# Patient Record
Sex: FEMALE | Race: BLACK OR AFRICAN AMERICAN
[De-identification: names, ages, dates, MRNs, and addresses within clinical notes are randomized per-mention and may not be internally consistent; named-entity substitution may affect disease eponyms.]

---

## 2018-02-19 ENCOUNTER — HOSPITAL ENCOUNTER (EMERGENCY)
Dept: HOSPITAL 17 - NEPK | Age: 48
Discharge: HOME | End: 2018-02-19
Payer: SELF-PAY

## 2018-02-19 VITALS
SYSTOLIC BLOOD PRESSURE: 124 MMHG | OXYGEN SATURATION: 98 % | DIASTOLIC BLOOD PRESSURE: 88 MMHG | HEART RATE: 94 BPM | RESPIRATION RATE: 16 BRPM | TEMPERATURE: 98.6 F

## 2018-02-19 DIAGNOSIS — S90.32XA: Primary | ICD-10-CM

## 2018-02-19 PROCEDURE — 73630 X-RAY EXAM OF FOOT: CPT

## 2018-02-19 PROCEDURE — 99283 EMERGENCY DEPT VISIT LOW MDM: CPT

## 2018-02-19 NOTE — PD
HPI


Chief Complaint:  Injury


Time Seen by Provider:  15:43


Travel History


International Travel<30 days:  No


Contact w/Intl Traveler<30days:  No


Traveled to known affect area:  No





History of Present Illness


HPI


48-year-old female here for evaluation of left foot pain.  She reports that 1 

week ago she dropped a plastic window blind on her left foot.  Since then she 

has had pain and bruising on the dorsum of the left foot.  Pain is throbbing, 

worse when walking.  She denies any puncture wounds.  She denies any other 

injuries and she has no other complaints at this time.





On license of [facility]


Social History


Alcohol Use:  No


Tobacco Use:  No





Allergies-Medications


(Allergen,Severity, Reaction):  


Coded Allergies:  


     No Known Allergies (Unverified , 2/19/18)


Reported Meds & Prescriptions





Reported Meds & Active Scripts


Active


Diclofenac Sodium DR (Diclofenac Sodium) 75 Mg Tabdr 75 Mg PO BID 10 Days








Review of Systems


Musculoskeletal:  Positive: Pain


Skin:  Positive Other (positive for bruising, pain)





Physical Exam


Narrative


GENERAL: Well-nourished female in no acute distress


SKIN: Warm and dry.  Bruising noted to the dorsum of the right foot.  

Nonerythematous.  No puncture wounds noted.


CARDIOVASCULAR: Regular rate and rhythm.  No murmur appreciated.


RESPIRATORY: No accessory muscle use. Clear to auscultation. Breath sounds 

equal bilaterally. 


MUSCULOSKELETAL: Skin as noted above with generalized tenderness to palpation 

of the dorsum of the left foot.  The patient intends full range of motion of 

the lower extremities.  2+ dorsalis pedis pulse.


NEUROLOGICAL: Awake and alert. No obvious cranial nerve deficits.  Motor 

grossly within normal limits. Normal speech.





Data


Data


Last Documented VS





Vital Signs








  Date Time  Temp Pulse Resp B/P (MAP) Pulse Ox O2 Delivery O2 Flow Rate FiO2


 


2/19/18 15:18 98.6 94 16 124/88 (100) 98   








Orders





 Orders


Foot, Complete ([id number]) (2/19/18 )


Ed Discharge Order (2/19/18 16:25)








[facility]


Medical Decision Making


Medical Screen Exam Complete:  Yes


Emergency Medical Condition:  Yes


Medical Record Reviewed:  Yes


Differential Diagnosis


Contusion, hematoma, cellulitis, fracture


Narrative Course


X-ray imaging reveals no acute bony abnormalities but incidental note was made 

of a radiopaque density in the interdigital webspace between the fourth and 

fifth digits which is not at all consistent with her current injury.  I 

discussed these findings with the patient.  She appears to have a contusion to 

her foot.  She declines crutches.  She is stable for discharge.





Diagnosis





 Primary Impression:  


 Contusion of left foot





***Additional Instructions:  


Medication as needed.  Take with meals.  Ice pack several times a day 20 

minutes at a time.  Elevate.  Rest.  Follow-up with primary care physician in 

one to 2 weeks.  Return for any emergent medical conditions.


***Med/Other Pt SpecificInfo:  Prescription(s) given


Scripts


Diclofenac Sodium  (Diclofenac Sodium DR) 75 Mg Tabdr


75 MG PO BID for 10 Days, #20 TAB 0 Refills


   Prov: Adiel Ochoa MD         2/19/18


Disposition:  01 DISCHARGE HOME


Condition:  Stable











Maurilio Tatum Feb 19, 2018 16:29

## 2018-02-19 NOTE — RADRPT
EXAM DATE/TIME:  02/19/2018 15:36 

 

[facility] COMPARISON:     

No previous studies available for comparison.

 

                     

INDICATIONS :     

Left foot pain after dropping object on foot.

                     

 

MEDICAL HISTORY :     

None.          

 

SURGICAL HISTORY :     

None.   

 

ENCOUNTER:     

Initial                                        

 

ACUITY:     

1 week      

 

PAIN SCORE:     

8/10

 

LOCATION:     

Left middle dorsal surface.

 

FINDINGS:     

The osseous structures are intact. The alignment is anatomic.

 

Note is made of a punctate radiodense foreign body in the webspace between the fourth and fifth digit
s.

 

CONCLUSION:     

1. No acute fracture identified.

2. Punctate metallic foreign body as above.

 

 

 

 Evaristo Storm MD on February 19, 2018 at 16:08           

Board Certified Radiologist.

 This report was verified electronically.